# Patient Record
Sex: FEMALE
[De-identification: names, ages, dates, MRNs, and addresses within clinical notes are randomized per-mention and may not be internally consistent; named-entity substitution may affect disease eponyms.]

---

## 2022-10-17 ENCOUNTER — NON-APPOINTMENT (OUTPATIENT)
Age: 37
End: 2022-10-17

## 2022-10-17 ENCOUNTER — APPOINTMENT (OUTPATIENT)
Dept: ELECTROPHYSIOLOGY | Facility: CLINIC | Age: 37
End: 2022-10-17

## 2022-10-17 VITALS
OXYGEN SATURATION: 98 % | DIASTOLIC BLOOD PRESSURE: 73 MMHG | SYSTOLIC BLOOD PRESSURE: 111 MMHG | HEART RATE: 67 BPM | HEIGHT: 64 IN | BODY MASS INDEX: 20.14 KG/M2 | WEIGHT: 118 LBS

## 2022-10-17 DIAGNOSIS — R00.0 TACHYCARDIA, UNSPECIFIED: ICD-10-CM

## 2022-10-17 DIAGNOSIS — R94.6 ABNORMAL RESULTS OF THYROID FUNCTION STUDIES: ICD-10-CM

## 2022-10-17 DIAGNOSIS — E05.90 THYROTOXICOSIS, UNSPECIFIED W/OUT THYROTOXIC CRISIS OR STORM: ICD-10-CM

## 2022-10-17 DIAGNOSIS — Z78.9 OTHER SPECIFIED HEALTH STATUS: ICD-10-CM

## 2022-10-17 PROBLEM — Z00.00 ENCOUNTER FOR PREVENTIVE HEALTH EXAMINATION: Status: ACTIVE | Noted: 2022-10-17

## 2022-10-17 PROCEDURE — 93000 ELECTROCARDIOGRAM COMPLETE: CPT

## 2022-10-17 PROCEDURE — 99205 OFFICE O/P NEW HI 60 MIN: CPT | Mod: 25

## 2022-10-17 RX ORDER — FERROUS SULFATE TAB EC 325 MG (65 MG FE EQUIVALENT) 325 (65 FE) MG
325 (65 FE) TABLET DELAYED RESPONSE ORAL
Qty: 60 | Refills: 0 | Status: DISCONTINUED | COMMUNITY
Start: 2022-09-11

## 2022-10-17 RX ORDER — METOPROLOL SUCCINATE 25 MG/1
25 TABLET, EXTENDED RELEASE ORAL
Qty: 90 | Refills: 0 | Status: ACTIVE | COMMUNITY
Start: 2022-09-01

## 2022-10-17 RX ORDER — DOCUSATE SODIUM 100 MG/1
100 CAPSULE, LIQUID FILLED ORAL
Qty: 60 | Refills: 0 | Status: DISCONTINUED | COMMUNITY
Start: 2022-09-11

## 2022-10-17 RX ORDER — IBUPROFEN 600 MG/1
600 TABLET, FILM COATED ORAL
Qty: 80 | Refills: 0 | Status: DISCONTINUED | COMMUNITY
Start: 2022-09-11

## 2022-10-17 RX ORDER — FLUCONAZOLE 150 MG/1
150 TABLET ORAL
Qty: 1 | Refills: 0 | Status: DISCONTINUED | COMMUNITY
Start: 2022-08-11

## 2022-10-18 NOTE — DISCUSSION/SUMMARY
[EKG obtained to assist in diagnosis and management of assessed problem(s)] : EKG obtained to assist in diagnosis and management of assessed problem(s) [FreeTextEntry1] : Impression:\par \par 1. Palpitations: EKG performed today to assess for presence of conduction disease and reveals NSR. Review of Holter without evidence of tachyarrhythmias. ECHO with normal LVEF. Suspect sinus tachycardia given gradual offset and relief with use of metoprolol and increased fluid volume/hydration. Concern about rates sustained in the 180s, possible arrhythmia? Past 30 day CardioNet without noted tachyarrhythmias. Recommend ILR placement for long term monitoring. Risks, benefits, and alternatives discussed. \par \par 2. Hyperthyroid: suspected hyperthyroid given abnormal TFTs; pending Endocrine eval for routine TFT monitoring and management.\par \par Plan for ILR placement.

## 2022-10-18 NOTE — HISTORY OF PRESENT ILLNESS
[FreeTextEntry1] : Ebonie Singh is a 36y/o with Hx of recurrent palpitations who presents today for initial evaluation. Admits after having COVID in 2021 she started feeling palpitations. Can feel her heart race all day long, not dependent on exertion. Has recorded heart rates into the 180s while sitting watching TV. Episodes come and go all day and gradually improve. Saw a Cardiologist and underwent a monitor for 30 days and was told her HR was elevated but no evidence of tachyarrhythmias. Of note, she gave birth a month ago and was noted to be hyperthyroid at that time. Pending Endocrine eval. While she was pregnant, felt improvement in symptoms. Now post partum, symptoms became worse. Has been on metoprolol 25mg daily. Finds improvement with use. If misses a dose, symptoms always occur. Holter in 5/2022 without evidence of tachyarrhythmias. Max HR 138bpm. ECHO with normal LVEF. Denies chest pain, SOB, syncope or near syncope.\par

## 2022-11-16 ENCOUNTER — APPOINTMENT (OUTPATIENT)
Dept: ELECTROPHYSIOLOGY | Facility: CLINIC | Age: 37
End: 2022-11-16

## 2023-06-08 ENCOUNTER — NON-APPOINTMENT (OUTPATIENT)
Age: 38
End: 2023-06-08

## 2023-06-19 ENCOUNTER — NON-APPOINTMENT (OUTPATIENT)
Age: 38
End: 2023-06-19

## 2023-07-14 ENCOUNTER — NON-APPOINTMENT (OUTPATIENT)
Age: 38
End: 2023-07-14

## 2023-07-14 DIAGNOSIS — R00.2 PALPITATIONS: ICD-10-CM

## 2023-08-07 ENCOUNTER — APPOINTMENT (OUTPATIENT)
Dept: ELECTROPHYSIOLOGY | Facility: CLINIC | Age: 38
End: 2023-08-07

## 2024-06-21 ENCOUNTER — APPOINTMENT (OUTPATIENT)
Dept: OTOLARYNGOLOGY | Facility: CLINIC | Age: 39
End: 2024-06-21

## 2024-07-09 ENCOUNTER — APPOINTMENT (OUTPATIENT)
Dept: OTOLARYNGOLOGY | Facility: CLINIC | Age: 39
End: 2024-07-09
Payer: MEDICAID

## 2024-07-09 ENCOUNTER — TRANSCRIPTION ENCOUNTER (OUTPATIENT)
Age: 39
End: 2024-07-09

## 2024-07-09 VITALS — WEIGHT: 116 LBS | BODY MASS INDEX: 20.55 KG/M2 | HEIGHT: 63 IN

## 2024-07-09 VITALS — HEART RATE: 72 BPM | DIASTOLIC BLOOD PRESSURE: 68 MMHG | SYSTOLIC BLOOD PRESSURE: 103 MMHG

## 2024-07-09 DIAGNOSIS — R22.1 LOCALIZED SWELLING, MASS AND LUMP, NECK: ICD-10-CM

## 2024-07-09 DIAGNOSIS — R49.0 DYSPHONIA: ICD-10-CM

## 2024-07-09 DIAGNOSIS — J35.8 OTHER CHRONIC DISEASES OF TONSILS AND ADENOIDS: ICD-10-CM

## 2024-07-09 PROCEDURE — 99204 OFFICE O/P NEW MOD 45 MIN: CPT

## 2024-07-09 RX ORDER — METHIMAZOLE 5 MG/1
TABLET ORAL
Refills: 0 | Status: ACTIVE | COMMUNITY

## 2024-07-18 ENCOUNTER — APPOINTMENT (OUTPATIENT)
Dept: ULTRASOUND IMAGING | Facility: CLINIC | Age: 39
End: 2024-07-18

## 2024-07-18 ENCOUNTER — OUTPATIENT (OUTPATIENT)
Dept: OUTPATIENT SERVICES | Facility: HOSPITAL | Age: 39
LOS: 1 days | End: 2024-07-18

## 2024-07-18 DIAGNOSIS — R22.1 LOCALIZED SWELLING, MASS AND LUMP, NECK: ICD-10-CM

## 2024-08-02 ENCOUNTER — APPOINTMENT (OUTPATIENT)
Dept: ULTRASOUND IMAGING | Facility: IMAGING CENTER | Age: 39
End: 2024-08-02
Payer: MEDICAID

## 2024-08-02 ENCOUNTER — OUTPATIENT (OUTPATIENT)
Dept: OUTPATIENT SERVICES | Facility: HOSPITAL | Age: 39
LOS: 1 days | End: 2024-08-02
Payer: MEDICAID

## 2024-08-02 DIAGNOSIS — R22.1 LOCALIZED SWELLING, MASS AND LUMP, NECK: ICD-10-CM

## 2024-08-02 DIAGNOSIS — Z00.8 ENCOUNTER FOR OTHER GENERAL EXAMINATION: ICD-10-CM

## 2024-08-02 PROCEDURE — 76536 US EXAM OF HEAD AND NECK: CPT | Mod: 26

## 2024-08-02 PROCEDURE — 76536 US EXAM OF HEAD AND NECK: CPT

## 2024-08-14 ENCOUNTER — TRANSCRIPTION ENCOUNTER (OUTPATIENT)
Age: 39
End: 2024-08-14

## 2024-08-20 ENCOUNTER — APPOINTMENT (OUTPATIENT)
Dept: OTOLARYNGOLOGY | Facility: CLINIC | Age: 39
End: 2024-08-20
Payer: MEDICAID

## 2024-08-20 DIAGNOSIS — J35.01 CHRONIC TONSILLITIS: ICD-10-CM

## 2024-08-20 DIAGNOSIS — Z00.00 ENCOUNTER FOR GENERAL ADULT MEDICAL EXAMINATION W/OUT ABNORMAL FINDINGS: ICD-10-CM

## 2024-08-20 PROCEDURE — 99214 OFFICE O/P EST MOD 30 MIN: CPT

## 2024-08-20 NOTE — CONSULT LETTER
[Dear  ___] : Dear  [unfilled], [Courtesy Letter:] : I had the pleasure of seeing your patient, [unfilled], in my office today. [Please see my note below.] : Please see my note below. [Consult Closing:] : Thank you very much for allowing me to participate in the care of this patient.  If you have any questions, please do not hesitate to contact me. [Sincerely,] : Sincerely, [FreeTextEntry2] : Carolyn Ruiz MD [FreeTextEntry3] : Ryan Singh MD, FACS Chief of Otolaryngology and Head & Neck Surgery - [facility]  - Dept. of Otolaryngology - [facility] [phone]

## 2024-08-20 NOTE — HISTORY OF PRESENT ILLNESS
[de-identified] : 38 year old female with history of tonsils stones. Presents for follow up evaluation of neck and thyroid. Last seen 7/09 ordered US Thyroid, Head & Neck. Completed US Thyroid 8/02, no US Head & Neck on file. Reports frequent throat discomfort secondary to tonsils stones. Occasional difficulty swallowing due to swollen tonsils. Treated for 2 tonsil infections within the past year. Currently on abx for right ear infection. Anterior neck mass stable in size since last seen. Reports discomfort and difficulty phonating. Continues on Methimazole for thyroid disease.  Pt has used a Water-pik, without resolution of her symptoms.

## 2024-08-20 NOTE — REASON FOR VISIT
[Subsequent Evaluation] : a subsequent evaluation for [FreeTextEntry2] : tonsil stones, neck mass and thyroid

## 2024-08-20 NOTE — PHYSICAL EXAM
[de-identified] : Prominence in region of thyroid cartilage notch.  Prominent R thyroid  [Midline] : trachea located in midline position [de-identified] : cryptic 3+ tonsils b/l [Normal] : no rashes

## 2024-08-20 NOTE — DATA REVIEWED
[de-identified] : 	 EXAM: [id number] - US THYROID ONLY  - ORDERED BY: JIM LEDEZMA   PROCEDURE DATE:  08/02/2024    INTERPRETATION:  Clinical history: Right thyroid mass  Technique: Thyroid sonogram.  Comparison: None  Findings:  Echotexture: Heterogeneous.  Thyroid size:  Right lobe: 6.4 cm x 2.4 cm x  2.4 cm. Left lobe: 6.4 cm x  1.9 cm x 1.7 cm. Isthmus: 3 mm.  Vascularity: Increased.   Impression:   No discrete mass is seen  Heterogeneous thyroid parenchyma with increased vascularity compatible with sequela of thyroiditis.  ACR TI-RADS recommendation 2017: TR5 - FNA if ? 1cm, follow-up if 0.5 - 0.9 cm every year for 5 years TR4 - FNA if ? 1.5cm, follow-up if 1 - 1.4 cm in 1, 2, 3 and 5 years TR3 - FNA if ? 2.5cm, follow-up if 1.5 - 2.4 cm in 1, 3 and 5 years TR2 (2 points) & TR1 (0 points) - No FNA or follow-up  --- End of Report ---       VANESSA ALEXANDER MD; Attending Radiologist This document has been electronically signed. Aug  6 2024  9:42PM

## 2024-08-23 ENCOUNTER — OUTPATIENT (OUTPATIENT)
Dept: OUTPATIENT SERVICES | Facility: HOSPITAL | Age: 39
LOS: 1 days | End: 2024-08-23
Payer: MEDICAID

## 2024-08-23 ENCOUNTER — APPOINTMENT (OUTPATIENT)
Dept: ULTRASOUND IMAGING | Facility: IMAGING CENTER | Age: 39
End: 2024-08-23

## 2024-08-23 DIAGNOSIS — Z00.8 ENCOUNTER FOR OTHER GENERAL EXAMINATION: ICD-10-CM

## 2024-08-23 DIAGNOSIS — R22.1 LOCALIZED SWELLING, MASS AND LUMP, NECK: ICD-10-CM

## 2024-08-23 PROCEDURE — 76536 US EXAM OF HEAD AND NECK: CPT

## 2024-08-23 PROCEDURE — 76536 US EXAM OF HEAD AND NECK: CPT | Mod: 26

## 2024-08-30 ENCOUNTER — APPOINTMENT (OUTPATIENT)
Dept: CARDIOLOGY | Facility: CLINIC | Age: 39
End: 2024-08-30
Payer: MEDICAID

## 2024-08-30 VITALS
RESPIRATION RATE: 16 BRPM | WEIGHT: 113 LBS | DIASTOLIC BLOOD PRESSURE: 60 MMHG | HEIGHT: 63 IN | SYSTOLIC BLOOD PRESSURE: 92 MMHG | TEMPERATURE: 97.8 F | HEART RATE: 83 BPM | BODY MASS INDEX: 20.02 KG/M2 | OXYGEN SATURATION: 99 %

## 2024-08-30 DIAGNOSIS — Z78.9 OTHER SPECIFIED HEALTH STATUS: ICD-10-CM

## 2024-08-30 DIAGNOSIS — I31.39 OTHER PERICARDIAL EFFUSION (NONINFLAMMATORY): ICD-10-CM

## 2024-08-30 DIAGNOSIS — Z83.49 FAMILY HISTORY OF OTHER ENDOCRINE, NUTRITIONAL AND METABOLIC DISEASES: ICD-10-CM

## 2024-08-30 DIAGNOSIS — Z13.228 ENCOUNTER FOR SCREENING FOR OTHER METABOLIC DISORDERS: ICD-10-CM

## 2024-08-30 DIAGNOSIS — J35.8 OTHER CHRONIC DISEASES OF TONSILS AND ADENOIDS: ICD-10-CM

## 2024-08-30 DIAGNOSIS — R00.2 PALPITATIONS: ICD-10-CM

## 2024-08-30 DIAGNOSIS — E05.90 THYROTOXICOSIS, UNSPECIFIED W/OUT THYROTOXIC CRISIS OR STORM: ICD-10-CM

## 2024-08-30 PROCEDURE — 93000 ELECTROCARDIOGRAM COMPLETE: CPT

## 2024-08-30 PROCEDURE — 99204 OFFICE O/P NEW MOD 45 MIN: CPT | Mod: 25

## 2024-08-30 PROCEDURE — 93306 TTE W/DOPPLER COMPLETE: CPT

## 2024-08-30 NOTE — HISTORY OF PRESENT ILLNESS
[FreeTextEntry1] : This is a 38 year y/o female with a PMHx of Hyperthyroidism, Palpitations presents today as a new patient to establish care. Pt reports hx of palpitations, she has seen Dr Garcia in the past, s/p 24 hr holter without evidence of tachy or bradyarrhythmias, reports controlled on Metoprolol. Follows with PCP at [facility]. She is pending tonsillectomy on 09/25/2024 due to Tonsillitis. She had echo in May 2024 with fluid around heart.  Denies chest pain, diaphoresis, vision changes, HA, dizziness, syncope, cough, wheezing, SOB/BRYAN, edema, fever, chills, infection.

## 2024-09-05 ENCOUNTER — NON-APPOINTMENT (OUTPATIENT)
Age: 39
End: 2024-09-05

## 2024-09-08 LAB
25(OH)D3 SERPL-MCNC: 25.7 NG/ML
ALBUMIN SERPL ELPH-MCNC: 4.6 G/DL
ALP BLD-CCNC: 59 U/L
ALT SERPL-CCNC: 16 U/L
ANION GAP SERPL CALC-SCNC: 11 MMOL/L
APPEARANCE: CLEAR
AST SERPL-CCNC: 13 U/L
BACTERIA: NEGATIVE /HPF
BASOPHILS # BLD AUTO: 0.03 K/UL
BASOPHILS NFR BLD AUTO: 0.4 %
BILIRUB SERPL-MCNC: 0.4 MG/DL
BILIRUBIN URINE: NEGATIVE
BLOOD URINE: NEGATIVE
BUN SERPL-MCNC: 11 MG/DL
CALCIUM SERPL-MCNC: 9.4 MG/DL
CAST: 0 /LPF
CHLORIDE SERPL-SCNC: 102 MMOL/L
CHOLEST SERPL-MCNC: 188 MG/DL
CO2 SERPL-SCNC: 27 MMOL/L
COLOR: YELLOW
CREAT SERPL-MCNC: 0.62 MG/DL
CRP SERPL-MCNC: <3 MG/L
EGFR: 118 ML/MIN/1.73M2
EOSINOPHIL # BLD AUTO: 0.1 K/UL
EOSINOPHIL NFR BLD AUTO: 1.3 %
EPITHELIAL CELLS: 11 /HPF
ERYTHROCYTE [SEDIMENTATION RATE] IN BLOOD BY WESTERGREN METHOD: < 2 MM/HR
ESTIMATED AVERAGE GLUCOSE: 105 MG/DL
GLUCOSE QUALITATIVE U: NEGATIVE MG/DL
GLUCOSE SERPL-MCNC: 85 MG/DL
HBA1C MFR BLD HPLC: 5.3 %
HCG SERPL-MCNC: <1 MIU/ML
HCT VFR BLD CALC: 38.8 %
HDLC SERPL-MCNC: 78 MG/DL
HGB BLD-MCNC: 12.8 G/DL
IMM GRANULOCYTES NFR BLD AUTO: 0.1 %
KETONES URINE: NEGATIVE MG/DL
LDLC SERPL CALC-MCNC: 94 MG/DL
LEUKOCYTE ESTERASE URINE: NEGATIVE
LYMPHOCYTES # BLD AUTO: 2.15 K/UL
LYMPHOCYTES NFR BLD AUTO: 28.3 %
MAGNESIUM SERPL-MCNC: 2.2 MG/DL
MAN DIFF?: NORMAL
MCHC RBC-ENTMCNC: 31 PG
MCHC RBC-ENTMCNC: 33 GM/DL
MCV RBC AUTO: 93.9 FL
MICROSCOPIC-UA: NORMAL
MONOCYTES # BLD AUTO: 0.45 K/UL
MONOCYTES NFR BLD AUTO: 5.9 %
NEUTROPHILS # BLD AUTO: 4.86 K/UL
NEUTROPHILS NFR BLD AUTO: 64 %
NITRITE URINE: NEGATIVE
NONHDLC SERPL-MCNC: 110 MG/DL
PH URINE: 6.5
PLATELET # BLD AUTO: 293 K/UL
POTASSIUM SERPL-SCNC: 4.1 MMOL/L
PROT SERPL-MCNC: 7.3 G/DL
PROTEIN URINE: NEGATIVE MG/DL
RBC # BLD: 4.13 M/UL
RBC # FLD: 13.1 %
RED BLOOD CELLS URINE: 4 /HPF
SODIUM SERPL-SCNC: 139 MMOL/L
SPECIFIC GRAVITY URINE: 1.01
T4 FREE SERPL-MCNC: 1.5 NG/DL
TRIGL SERPL-MCNC: 89 MG/DL
TSH SERPL-ACNC: 0.84 UIU/ML
UROBILINOGEN URINE: 0.2 MG/DL
WBC # FLD AUTO: 7.6 K/UL
WHITE BLOOD CELLS URINE: 0 /HPF

## 2024-09-13 ENCOUNTER — OUTPATIENT (OUTPATIENT)
Dept: OUTPATIENT SERVICES | Facility: HOSPITAL | Age: 39
LOS: 1 days | End: 2024-09-13

## 2024-09-13 VITALS
HEART RATE: 71 BPM | DIASTOLIC BLOOD PRESSURE: 67 MMHG | OXYGEN SATURATION: 100 % | TEMPERATURE: 98 F | SYSTOLIC BLOOD PRESSURE: 103 MMHG | RESPIRATION RATE: 16 BRPM | WEIGHT: 113.1 LBS | HEIGHT: 66 IN

## 2024-09-13 DIAGNOSIS — Z98.890 OTHER SPECIFIED POSTPROCEDURAL STATES: Chronic | ICD-10-CM

## 2024-09-13 DIAGNOSIS — J03.90 ACUTE TONSILLITIS, UNSPECIFIED: ICD-10-CM

## 2024-09-13 DIAGNOSIS — Z98.891 HISTORY OF UTERINE SCAR FROM PREVIOUS SURGERY: Chronic | ICD-10-CM

## 2024-09-13 DIAGNOSIS — J35.8 OTHER CHRONIC DISEASES OF TONSILS AND ADENOIDS: ICD-10-CM

## 2024-09-13 DIAGNOSIS — R00.2 PALPITATIONS: ICD-10-CM

## 2024-09-13 DIAGNOSIS — E05.90 THYROTOXICOSIS, UNSPECIFIED WITHOUT THYROTOXIC CRISIS OR STORM: ICD-10-CM

## 2024-09-13 RX ORDER — SODIUM CHLORIDE IRRIG SOLUTION 0.9 %
1000 SOLUTION, IRRIGATION IRRIGATION
Refills: 0 | Status: DISCONTINUED | OUTPATIENT
Start: 2024-09-25 | End: 2024-10-09

## 2024-09-13 NOTE — H&P PST ADULT - PROBLEM SELECTOR PLAN 1
Patient tentatively scheduled for tonsillectomy     Pre-op instructions provided.  Famotidine provided with instructions. Hibiclens provided with instructions and was signed by patient. Teach-back method was utilized to assess patient's understanding. Patient verbalized understanding.    copy CBC, CMP, TSH, freet4, EKG, ECHO in the chart

## 2024-09-13 NOTE — H&P PST ADULT - PROBLEM SELECTOR PLAN 4
Patient eligible for erica risk screen age>75?  No    Health care proxy paperwork given to patient? Yes (all patients should be given the packet to fill out at home and return on day of surgery to pre-op RN)    Impaired mobility (ie: uses cane, walker, wheelchair, or assist device)?    Known dementia diagnosis?    Impaired functional status (METS<4)?    Malnutrition BMI<20?

## 2024-09-13 NOTE — H&P PST ADULT - LAST ECHOCARDIOGRAM
Aug 30th 2024--for hx of known pericardial effusion( 4 years) --- Normal LV/RV function and cavity EF 66%, Trace miguel cardial effusion, trace MR, MI

## 2024-09-13 NOTE — H&P PST ADULT - NSICDXPASTSURGICALHX_GEN_ALL_CORE_FT
PAST SURGICAL HISTORY:  H/O hemorrhoidectomy     H/O hernia repair     H/O:      S/P correction of deviated nasal septum

## 2024-09-13 NOTE — H&P PST ADULT - HISTORY OF PRESENT ILLNESS
38 year old female with hx of hyperthyroidism, Mild pericardial effusion, and  tonsils stones.  Reports frequent throat discomfort secondary to tonsils stones. Occasional difficulty swallowing due to swollen tonsils. Treated for 2 tonsil infections within the past year. Patient is scheduled for tonsillectomy

## 2024-09-24 ENCOUNTER — TRANSCRIPTION ENCOUNTER (OUTPATIENT)
Age: 39
End: 2024-09-24

## 2024-09-24 NOTE — ASU PATIENT PROFILE, ADULT - VISION (WITH CORRECTIVE LENSES IF THE PATIENT USUALLY WEARS THEM):
glases/Partially impaired: cannot see medication labels or newsprint, but can see obstacles in path, and the surrounding layout; can count fingers at arm's length

## 2024-09-25 ENCOUNTER — APPOINTMENT (OUTPATIENT)
Dept: OTOLARYNGOLOGY | Facility: HOSPITAL | Age: 39
End: 2024-09-25

## 2024-09-25 ENCOUNTER — RESULT REVIEW (OUTPATIENT)
Age: 39
End: 2024-09-25

## 2024-09-25 ENCOUNTER — OUTPATIENT (OUTPATIENT)
Dept: OUTPATIENT SERVICES | Facility: HOSPITAL | Age: 39
LOS: 1 days | Discharge: HOME | End: 2024-09-25
Payer: MEDICAID

## 2024-09-25 ENCOUNTER — TRANSCRIPTION ENCOUNTER (OUTPATIENT)
Age: 39
End: 2024-09-25

## 2024-09-25 VITALS
DIASTOLIC BLOOD PRESSURE: 72 MMHG | SYSTOLIC BLOOD PRESSURE: 111 MMHG | OXYGEN SATURATION: 100 % | HEART RATE: 71 BPM | TEMPERATURE: 97 F | RESPIRATION RATE: 16 BRPM

## 2024-09-25 VITALS
RESPIRATION RATE: 16 BRPM | DIASTOLIC BLOOD PRESSURE: 70 MMHG | TEMPERATURE: 98 F | SYSTOLIC BLOOD PRESSURE: 108 MMHG | OXYGEN SATURATION: 100 % | WEIGHT: 113.1 LBS | HEART RATE: 61 BPM | HEIGHT: 66 IN

## 2024-09-25 DIAGNOSIS — Z98.890 OTHER SPECIFIED POSTPROCEDURAL STATES: Chronic | ICD-10-CM

## 2024-09-25 DIAGNOSIS — J35.8 OTHER CHRONIC DISEASES OF TONSILS AND ADENOIDS: ICD-10-CM

## 2024-09-25 DIAGNOSIS — Z98.891 HISTORY OF UTERINE SCAR FROM PREVIOUS SURGERY: Chronic | ICD-10-CM

## 2024-09-25 LAB — HCG UR QL: NEGATIVE — SIGNIFICANT CHANGE UP

## 2024-09-25 PROCEDURE — 42826 REMOVAL OF TONSILS: CPT | Mod: GC

## 2024-09-25 PROCEDURE — 88304 TISSUE EXAM BY PATHOLOGIST: CPT | Mod: 26

## 2024-09-25 RX ORDER — FENTANYL CITRATE-0.9 % NACL/PF 300MCG/30
50 PATIENT CONTROLLED ANALGESIA VIAL INJECTION
Refills: 0 | Status: DISCONTINUED | OUTPATIENT
Start: 2024-09-25 | End: 2024-09-25

## 2024-09-25 RX ORDER — AMOXICILLIN 250 MG/5ML
10 SUSPENSION, RECONSTITUTED, ORAL (ML) ORAL
Qty: 2 | Refills: 0
Start: 2024-09-25 | End: 2024-10-04

## 2024-09-25 RX ORDER — OXYCODONE HYDROCHLORIDE 30 MG/1
5 TABLET, FILM COATED, EXTENDED RELEASE ORAL
Qty: 100 | Refills: 0
Start: 2024-09-25

## 2024-09-25 RX ORDER — OXYCODONE AND ACETAMINOPHEN 5; 325 MG/1; MG/1
1 TABLET ORAL
Qty: 30 | Refills: 0
Start: 2024-09-25 | End: 2024-09-29

## 2024-09-25 RX ORDER — METOPROLOL TARTRATE 100 MG/1
0 TABLET ORAL
Refills: 0 | DISCHARGE

## 2024-09-25 RX ORDER — HYDROMORPHONE HYDROCHLORIDE 1 MG/ML
0.5 INJECTION, SOLUTION INTRAMUSCULAR; INTRAVENOUS; SUBCUTANEOUS
Refills: 0 | Status: DISCONTINUED | OUTPATIENT
Start: 2024-09-25 | End: 2024-09-25

## 2024-09-25 RX ORDER — SODIUM CHLORIDE IRRIG SOLUTION 0.9 %
500 SOLUTION, IRRIGATION IRRIGATION ONCE
Refills: 0 | Status: COMPLETED | OUTPATIENT
Start: 2024-09-25 | End: 2024-09-25

## 2024-09-25 RX ORDER — ONDANSETRON HCL/PF 4 MG/2 ML
4 VIAL (ML) INJECTION ONCE
Refills: 0 | Status: COMPLETED | OUTPATIENT
Start: 2024-09-25 | End: 2024-09-25

## 2024-09-25 RX ORDER — METHIMAZOLE 20 MG
1 TABLET ORAL
Refills: 0 | DISCHARGE

## 2024-09-25 RX ORDER — OXYCODONE HYDROCHLORIDE 30 MG/1
2.5 TABLET, FILM COATED, EXTENDED RELEASE ORAL ONCE
Refills: 0 | Status: DISCONTINUED | OUTPATIENT
Start: 2024-09-25 | End: 2024-09-25

## 2024-09-25 RX ADMIN — Medication 30 MILLILITER(S): at 13:50

## 2024-09-25 RX ADMIN — Medication 4 MILLIGRAM(S): at 14:20

## 2024-09-25 RX ADMIN — OXYCODONE HYDROCHLORIDE 2.5 MILLIGRAM(S): 30 TABLET, FILM COATED, EXTENDED RELEASE ORAL at 15:45

## 2024-09-25 RX ADMIN — Medication 1000 MILLILITER(S): at 13:50

## 2024-09-25 NOTE — ASU DISCHARGE PLAN (ADULT/PEDIATRIC) - PAIN MANAGEMENT
Prescriptions electronically submitted to pharmacy from doctor's office [employer]  [id number]/Prescriptions electronically submitted to pharmacy from doctor's office

## 2024-09-25 NOTE — ASU DISCHARGE PLAN (ADULT/PEDIATRIC) - NURSING INSTRUCTIONS
You received IV Tylenol for pain management at 11:15am. Please DO NOT take any Tylenol (Acetaminophen) containing products, such as Vicodin, Percocet, Excedrin, and cold medications for the next 6 hours (until 5:15PM). DO NOT TAKE MORE THAN 4000 MG OF TYLENOL in a 24 hour period.

## 2024-09-25 NOTE — ASU DISCHARGE PLAN (ADULT/PEDIATRIC) - CARE PROVIDER_API CALL
Ryan Singh  Head/Neck Surgery  [address] [place] [address], [address]  [place] [address]  Phone: [phone]  Fax: [phone]  Jdteryamoym Patient  Follow Up Time: 2 weeks

## 2024-09-27 RX ORDER — ONDANSETRON 4 MG/1
4 TABLET, ORALLY DISINTEGRATING ORAL
Qty: 15 | Refills: 0 | Status: ACTIVE | COMMUNITY
Start: 2024-09-27 | End: 1900-01-01

## 2024-09-27 RX ORDER — IBUPROFEN 100 MG/5ML
100 SUSPENSION ORAL
Qty: 2 | Refills: 3 | Status: ACTIVE | COMMUNITY
Start: 2024-09-27 | End: 1900-01-01

## 2024-10-03 LAB — SURGICAL PATHOLOGY STUDY: SIGNIFICANT CHANGE UP

## 2024-10-08 ENCOUNTER — APPOINTMENT (OUTPATIENT)
Dept: OTOLARYNGOLOGY | Facility: CLINIC | Age: 39
End: 2024-10-08
Payer: MEDICAID

## 2024-10-08 VITALS — WEIGHT: 112 LBS | HEIGHT: 63 IN | BODY MASS INDEX: 19.84 KG/M2

## 2024-10-08 VITALS — SYSTOLIC BLOOD PRESSURE: 112 MMHG | HEART RATE: 65 BPM | DIASTOLIC BLOOD PRESSURE: 65 MMHG

## 2024-10-08 DIAGNOSIS — J35.01 CHRONIC TONSILLITIS: ICD-10-CM

## 2024-10-08 PROCEDURE — 99024 POSTOP FOLLOW-UP VISIT: CPT

## 2024-10-08 RX ORDER — IBUPROFEN 600 MG/1
600 TABLET, FILM COATED ORAL 3 TIMES DAILY
Qty: 30 | Refills: 0 | Status: ACTIVE | COMMUNITY
Start: 2024-10-08 | End: 1900-01-01

## 2024-10-11 ENCOUNTER — TRANSCRIPTION ENCOUNTER (OUTPATIENT)
Age: 39
End: 2024-10-11

## 2024-10-17 PROBLEM — R00.2 PALPITATIONS: Chronic | Status: ACTIVE | Noted: 2024-09-13

## 2024-10-17 PROBLEM — E05.90 THYROTOXICOSIS, UNSPECIFIED WITHOUT THYROTOXIC CRISIS OR STORM: Chronic | Status: ACTIVE | Noted: 2024-09-13

## 2024-10-17 PROBLEM — J03.90 ACUTE TONSILLITIS, UNSPECIFIED: Chronic | Status: ACTIVE | Noted: 2024-09-13

## 2024-11-05 ENCOUNTER — APPOINTMENT (OUTPATIENT)
Dept: CARDIOLOGY | Facility: CLINIC | Age: 39
End: 2024-11-05
Payer: MEDICAID

## 2024-11-05 VITALS
SYSTOLIC BLOOD PRESSURE: 96 MMHG | TEMPERATURE: 98 F | DIASTOLIC BLOOD PRESSURE: 54 MMHG | WEIGHT: 111 LBS | BODY MASS INDEX: 19.67 KG/M2 | HEART RATE: 66 BPM | OXYGEN SATURATION: 99 % | HEIGHT: 63 IN

## 2024-11-05 DIAGNOSIS — E05.90 THYROTOXICOSIS, UNSPECIFIED W/OUT THYROTOXIC CRISIS OR STORM: ICD-10-CM

## 2024-11-05 DIAGNOSIS — Z13.228 ENCOUNTER FOR SCREENING FOR OTHER METABOLIC DISORDERS: ICD-10-CM

## 2024-11-05 DIAGNOSIS — E55.9 VITAMIN D DEFICIENCY, UNSPECIFIED: ICD-10-CM

## 2024-11-05 DIAGNOSIS — R00.2 PALPITATIONS: ICD-10-CM

## 2024-11-05 DIAGNOSIS — J35.01 CHRONIC TONSILLITIS: ICD-10-CM

## 2024-11-05 PROCEDURE — 99214 OFFICE O/P EST MOD 30 MIN: CPT

## 2024-11-05 PROCEDURE — 93000 ELECTROCARDIOGRAM COMPLETE: CPT

## 2024-11-05 PROCEDURE — G2211 COMPLEX E/M VISIT ADD ON: CPT | Mod: NC

## 2024-11-05 PROCEDURE — 93308 TTE F-UP OR LMTD: CPT

## 2024-11-07 ENCOUNTER — NON-APPOINTMENT (OUTPATIENT)
Age: 39
End: 2024-11-07

## 2024-11-07 ENCOUNTER — TRANSCRIPTION ENCOUNTER (OUTPATIENT)
Age: 39
End: 2024-11-07

## 2024-11-07 LAB
25(OH)D3 SERPL-MCNC: 20.1 NG/ML
ALBUMIN SERPL ELPH-MCNC: 4.4 G/DL
ALP BLD-CCNC: 54 U/L
ALT SERPL-CCNC: 11 U/L
ANA SER IF-ACNC: NEGATIVE
ANION GAP SERPL CALC-SCNC: 13 MMOL/L
APPEARANCE: CLEAR
AST SERPL-CCNC: 13 U/L
BACTERIA: NEGATIVE /HPF
BASOPHILS # BLD AUTO: 0.03 K/UL
BASOPHILS NFR BLD AUTO: 0.4 %
BILIRUB DIRECT SERPL-MCNC: 0.1 MG/DL
BILIRUB INDIRECT SERPL-MCNC: 0.4 MG/DL
BILIRUB SERPL-MCNC: 0.5 MG/DL
BILIRUBIN URINE: NEGATIVE
BLOOD URINE: NEGATIVE
BUN SERPL-MCNC: 10 MG/DL
CALCIUM SERPL-MCNC: 9 MG/DL
CAST: 1 /LPF
CHLORIDE SERPL-SCNC: 102 MMOL/L
CHOLEST SERPL-MCNC: 183 MG/DL
CO2 SERPL-SCNC: 22 MMOL/L
COLOR: YELLOW
CREAT SERPL-MCNC: 0.6 MG/DL
CRP SERPL-MCNC: <3 MG/L
EGFR: 118 ML/MIN/1.73M2
EOSINOPHIL # BLD AUTO: 0.11 K/UL
EOSINOPHIL NFR BLD AUTO: 1.5 %
EPITHELIAL CELLS: 5 /HPF
ERYTHROCYTE [SEDIMENTATION RATE] IN BLOOD BY WESTERGREN METHOD: 6 MM/HR
ESTIMATED AVERAGE GLUCOSE: 108 MG/DL
GLUCOSE QUALITATIVE U: NEGATIVE MG/DL
GLUCOSE SERPL-MCNC: 96 MG/DL
HBA1C MFR BLD HPLC: 5.4 %
HCT VFR BLD CALC: 36.4 %
HDLC SERPL-MCNC: 78 MG/DL
HGB BLD-MCNC: 11.8 G/DL
IMM GRANULOCYTES NFR BLD AUTO: 0.4 %
KETONES URINE: NEGATIVE MG/DL
LDLC SERPL CALC-MCNC: 91 MG/DL
LEUKOCYTE ESTERASE URINE: NEGATIVE
LYMPHOCYTES # BLD AUTO: 1.99 K/UL
LYMPHOCYTES NFR BLD AUTO: 27.6 %
MAGNESIUM SERPL-MCNC: 2.1 MG/DL
MAN DIFF?: NORMAL
MCHC RBC-ENTMCNC: 30.2 PG
MCHC RBC-ENTMCNC: 32.4 G/DL
MCV RBC AUTO: 93.1 FL
MICROSCOPIC-UA: NORMAL
MONOCYTES # BLD AUTO: 0.39 K/UL
MONOCYTES NFR BLD AUTO: 5.4 %
NEUTROPHILS # BLD AUTO: 4.66 K/UL
NEUTROPHILS NFR BLD AUTO: 64.7 %
NITRITE URINE: NEGATIVE
NONHDLC SERPL-MCNC: 105 MG/DL
PH URINE: 6
PLATELET # BLD AUTO: 242 K/UL
POTASSIUM SERPL-SCNC: 3.8 MMOL/L
PROT SERPL-MCNC: 6.7 G/DL
PROTEIN URINE: NEGATIVE MG/DL
RBC # BLD: 3.91 M/UL
RBC # FLD: 13.4 %
RED BLOOD CELLS URINE: 2 /HPF
SODIUM SERPL-SCNC: 137 MMOL/L
SPECIFIC GRAVITY URINE: 1.02
T4 FREE SERPL-MCNC: 1.3 NG/DL
TRIGL SERPL-MCNC: 73 MG/DL
TSH SERPL-ACNC: 0.96 UIU/ML
UROBILINOGEN URINE: 1 MG/DL
WBC # FLD AUTO: 7.21 K/UL
WHITE BLOOD CELLS URINE: 0 /HPF

## 2024-11-07 RX ORDER — ERGOCALCIFEROL (VITAMIN D2) 50 MCG
50 MCG CAPSULE ORAL
Qty: 90 | Refills: 0 | Status: ACTIVE | COMMUNITY
Start: 2024-11-07 | End: 1900-01-01

## 2024-11-11 ENCOUNTER — APPOINTMENT (OUTPATIENT)
Dept: CARDIOLOGY | Facility: CLINIC | Age: 39
End: 2024-11-11
Payer: MEDICAID

## 2024-11-11 DIAGNOSIS — R94.39 ABNORMAL RESULT OF OTHER CARDIOVASCULAR FUNCTION STUDY: ICD-10-CM

## 2024-11-11 DIAGNOSIS — I31.39 OTHER PERICARDIAL EFFUSION (NONINFLAMMATORY): ICD-10-CM

## 2024-11-11 PROCEDURE — 93015 CV STRESS TEST SUPVJ I&R: CPT

## 2024-11-12 ENCOUNTER — TRANSCRIPTION ENCOUNTER (OUTPATIENT)
Age: 39
End: 2024-11-12

## 2024-11-14 ENCOUNTER — NON-APPOINTMENT (OUTPATIENT)
Age: 39
End: 2024-11-14

## 2024-11-14 LAB — HCG SERPL-MCNC: <1 MIU/ML

## 2024-11-24 PROCEDURE — 93241 XTRNL ECG REC>48HR<7D: CPT

## 2024-11-25 ENCOUNTER — NON-APPOINTMENT (OUTPATIENT)
Age: 39
End: 2024-11-25

## 2024-12-05 ENCOUNTER — APPOINTMENT (OUTPATIENT)
Dept: MRI IMAGING | Facility: CLINIC | Age: 39
End: 2024-12-05
Payer: MEDICAID

## 2024-12-05 PROCEDURE — A9585: CPT

## 2024-12-05 PROCEDURE — 75561 CARDIAC MRI FOR MORPH W/DYE: CPT

## 2024-12-17 ENCOUNTER — APPOINTMENT (OUTPATIENT)
Dept: CARDIOLOGY | Facility: CLINIC | Age: 39
End: 2024-12-17

## 2024-12-24 ENCOUNTER — NON-APPOINTMENT (OUTPATIENT)
Age: 39
End: 2024-12-24

## 2025-01-07 ENCOUNTER — APPOINTMENT (OUTPATIENT)
Dept: CARDIOLOGY | Facility: CLINIC | Age: 40
End: 2025-01-07

## 2025-01-14 ENCOUNTER — APPOINTMENT (OUTPATIENT)
Dept: OBGYN | Facility: CLINIC | Age: 40
End: 2025-01-14
Payer: MEDICAID

## 2025-01-14 PROCEDURE — 99385 PREV VISIT NEW AGE 18-39: CPT

## 2025-01-14 PROCEDURE — 99459 PELVIC EXAMINATION: CPT

## 2025-01-14 PROCEDURE — 81002 URINALYSIS NONAUTO W/O SCOPE: CPT

## 2025-01-28 ENCOUNTER — APPOINTMENT (OUTPATIENT)
Dept: ENDOCRINOLOGY | Facility: CLINIC | Age: 40
End: 2025-01-28

## 2025-02-18 ENCOUNTER — APPOINTMENT (OUTPATIENT)
Dept: OTOLARYNGOLOGY | Facility: CLINIC | Age: 40
End: 2025-02-18

## 2025-03-18 ENCOUNTER — APPOINTMENT (OUTPATIENT)
Dept: PULMONOLOGY | Facility: CLINIC | Age: 40
End: 2025-03-18

## 2025-03-18 ENCOUNTER — APPOINTMENT (OUTPATIENT)
Dept: OBGYN | Facility: CLINIC | Age: 40
End: 2025-03-18

## 2025-03-18 PROCEDURE — 56405 I&D VULVA/PERINEAL ABSCESS: CPT

## 2025-05-20 ENCOUNTER — APPOINTMENT (OUTPATIENT)
Dept: OBGYN | Facility: CLINIC | Age: 40
End: 2025-05-20

## 2025-05-27 ENCOUNTER — APPOINTMENT (OUTPATIENT)
Dept: OBGYN | Facility: CLINIC | Age: 40
End: 2025-05-27
Payer: MEDICAID

## 2025-05-27 PROCEDURE — 56405 I&D VULVA/PERINEAL ABSCESS: CPT

## 2025-08-25 ENCOUNTER — TRANSCRIPTION ENCOUNTER (OUTPATIENT)
Age: 40
End: 2025-08-25

## (undated) DEVICE — GLV 7.5 PROTEXIS (WHITE)

## (undated) DEVICE — URETERAL CATH RED RUBBER 12FR (WHITE)

## (undated) DEVICE — CANISTER DISPOSABLE THIN WALL 3000CC

## (undated) DEVICE — TONSIL ROLLS

## (undated) DEVICE — ELCTR GROUNDING PAD ADULT COVIDIEN

## (undated) DEVICE — URETERAL CATH RED RUBBER 10FR (BLACK)

## (undated) DEVICE — LABELS BLANK W PEN

## (undated) DEVICE — GLV 8 PROTEXIS (WHITE)

## (undated) DEVICE — VENODYNE/SCD SLEEVE CALF MEDIUM

## (undated) DEVICE — CATH NG SALEM SUMP 14FR

## (undated) DEVICE — POSITIONER STRAP ARMBOARD VELCRO TS-30

## (undated) DEVICE — PACK T & A

## (undated) DEVICE — WARMING BLANKET FULL ADULT